# Patient Record
Sex: FEMALE | Race: WHITE | NOT HISPANIC OR LATINO | Employment: OTHER | URBAN - METROPOLITAN AREA
[De-identification: names, ages, dates, MRNs, and addresses within clinical notes are randomized per-mention and may not be internally consistent; named-entity substitution may affect disease eponyms.]

---

## 2017-02-01 ENCOUNTER — HISTORICAL (OUTPATIENT)
Dept: ADMINISTRATIVE | Facility: HOSPITAL | Age: 72
End: 2017-02-01

## 2017-02-06 ENCOUNTER — HISTORICAL (OUTPATIENT)
Dept: RADIOLOGY | Facility: HOSPITAL | Age: 72
End: 2017-02-06

## 2017-06-08 ENCOUNTER — HISTORICAL (OUTPATIENT)
Dept: PREADMISSION TESTING | Facility: HOSPITAL | Age: 72
End: 2017-06-08

## 2017-06-08 ENCOUNTER — HISTORICAL (OUTPATIENT)
Dept: LAB | Facility: HOSPITAL | Age: 72
End: 2017-06-08

## 2017-06-28 ENCOUNTER — HISTORICAL (OUTPATIENT)
Dept: ADMINISTRATIVE | Facility: HOSPITAL | Age: 72
End: 2017-06-28

## 2017-07-07 ENCOUNTER — HISTORICAL (OUTPATIENT)
Dept: SURGERY | Facility: HOSPITAL | Age: 72
End: 2017-07-07

## 2018-03-01 ENCOUNTER — HISTORICAL (OUTPATIENT)
Dept: RADIOLOGY | Facility: HOSPITAL | Age: 73
End: 2018-03-01

## 2018-03-26 ENCOUNTER — HISTORICAL (OUTPATIENT)
Dept: ADMINISTRATIVE | Facility: HOSPITAL | Age: 73
End: 2018-03-26

## 2018-03-27 ENCOUNTER — HISTORICAL (OUTPATIENT)
Dept: RADIOLOGY | Facility: HOSPITAL | Age: 73
End: 2018-03-27

## 2018-06-02 ENCOUNTER — HISTORICAL (OUTPATIENT)
Dept: URGENT CARE | Facility: CLINIC | Age: 73
End: 2018-06-02

## 2018-06-02 ENCOUNTER — HISTORICAL (OUTPATIENT)
Dept: ADMINISTRATIVE | Facility: HOSPITAL | Age: 73
End: 2018-06-02

## 2020-10-21 ENCOUNTER — HISTORICAL (OUTPATIENT)
Dept: ADMINISTRATIVE | Facility: HOSPITAL | Age: 75
End: 2020-10-21

## 2021-03-16 ENCOUNTER — HISTORICAL (OUTPATIENT)
Dept: RADIOLOGY | Facility: HOSPITAL | Age: 76
End: 2021-03-16

## 2021-04-13 ENCOUNTER — HISTORICAL (OUTPATIENT)
Dept: ADMINISTRATIVE | Facility: HOSPITAL | Age: 76
End: 2021-04-13

## 2021-09-01 ENCOUNTER — HISTORICAL (OUTPATIENT)
Dept: ADMINISTRATIVE | Facility: HOSPITAL | Age: 76
End: 2021-09-01

## 2021-10-22 ENCOUNTER — HISTORICAL (OUTPATIENT)
Dept: ADMINISTRATIVE | Facility: HOSPITAL | Age: 76
End: 2021-10-22

## 2021-12-22 ENCOUNTER — HISTORICAL (OUTPATIENT)
Dept: ADMINISTRATIVE | Facility: HOSPITAL | Age: 76
End: 2021-12-22

## 2022-01-21 ENCOUNTER — HISTORICAL (OUTPATIENT)
Dept: ADMINISTRATIVE | Facility: HOSPITAL | Age: 77
End: 2022-01-21

## 2022-01-21 LAB
ALBUMIN SERPL-MCNC: 3.4 GM/DL (ref 3.4–4.8)
ALBUMIN/GLOB SERPL: 1.3 RATIO (ref 1.1–2)
ALP SERPL-CCNC: 76 UNIT/L (ref 40–150)
ALT SERPL-CCNC: 11 UNIT/L (ref 0–55)
AST SERPL-CCNC: 11 UNIT/L (ref 5–34)
BILIRUB SERPL-MCNC: 0.6 MG/DL
BILIRUBIN DIRECT+TOT PNL SERPL-MCNC: 0.2 MG/DL (ref 0–0.5)
BILIRUBIN DIRECT+TOT PNL SERPL-MCNC: 0.4 MG/DL (ref 0–0.8)
BUN SERPL-MCNC: 12 MG/DL (ref 9.8–20.1)
CALCIUM SERPL-MCNC: 7.9 MG/DL (ref 8.7–10.5)
CHLORIDE SERPL-SCNC: 103 MMOL/L (ref 98–107)
CO2 SERPL-SCNC: 27 MMOL/L (ref 23–31)
CREAT SERPL-MCNC: 0.87 MG/DL (ref 0.55–1.02)
DEPRECATED CALCIDIOL+CALCIFEROL SERPL-MC: 31.2 NG/ML (ref 30–80)
EST. AVERAGE GLUCOSE BLD GHB EST-MCNC: 125.5 MG/DL
GLOBULIN SER-MCNC: 2.6 GM/DL (ref 2.4–3.5)
GLUCOSE SERPL-MCNC: 115 MG/DL (ref 82–115)
HBA1C MFR BLD: 6 %
POTASSIUM SERPL-SCNC: 4.3 MMOL/L (ref 3.5–5.1)
PROT SERPL-MCNC: 6 GM/DL (ref 5.8–7.6)
SODIUM SERPL-SCNC: 138 MMOL/L (ref 136–145)

## 2022-03-18 ENCOUNTER — HISTORICAL (OUTPATIENT)
Dept: RADIOLOGY | Facility: HOSPITAL | Age: 77
End: 2022-03-18

## 2022-03-18 ENCOUNTER — HISTORICAL (OUTPATIENT)
Dept: ADMINISTRATIVE | Facility: HOSPITAL | Age: 77
End: 2022-03-18

## 2022-04-07 ENCOUNTER — HISTORICAL (OUTPATIENT)
Dept: ADMINISTRATIVE | Facility: HOSPITAL | Age: 77
End: 2022-04-07
Payer: MEDICARE

## 2022-04-23 VITALS
DIASTOLIC BLOOD PRESSURE: 70 MMHG | BODY MASS INDEX: 45.62 KG/M2 | SYSTOLIC BLOOD PRESSURE: 122 MMHG | WEIGHT: 257.5 LBS | OXYGEN SATURATION: 97 % | HEIGHT: 63 IN

## 2022-04-26 ENCOUNTER — HISTORICAL (OUTPATIENT)
Dept: ADMINISTRATIVE | Facility: HOSPITAL | Age: 77
End: 2022-04-26
Payer: MEDICARE

## 2022-04-26 LAB
ABS NEUT (OLG): 3.73 (ref 2.1–9.2)
ALBUMIN SERPL-MCNC: 3.8 G/DL (ref 3.4–4.8)
ALBUMIN/GLOB SERPL: 1.5 {RATIO} (ref 1.1–2)
ALP SERPL-CCNC: 53 U/L (ref 40–150)
ALT SERPL-CCNC: 27 U/L (ref 0–55)
AST SERPL-CCNC: 19 U/L (ref 5–34)
BASOPHILS # BLD AUTO: 0 10*3/UL (ref 0–0.2)
BASOPHILS NFR BLD AUTO: 1 %
BILIRUB SERPL-MCNC: 0.6 MG/DL
BILIRUBIN DIRECT+TOT PNL SERPL-MCNC: 0.2 (ref 0–0.5)
BILIRUBIN DIRECT+TOT PNL SERPL-MCNC: 0.4 (ref 0–0.8)
BUN SERPL-MCNC: 16.9 MG/DL (ref 9.8–20.1)
CALCIUM SERPL-MCNC: 9 MG/DL (ref 8.7–10.5)
CHLORIDE SERPL-SCNC: 105 MMOL/L (ref 98–107)
CHOLEST SERPL-MCNC: 184 MG/DL
CHOLEST/HDLC SERPL: 4 {RATIO} (ref 0–5)
CO2 SERPL-SCNC: 29 MMOL/L (ref 23–31)
CREAT SERPL-MCNC: 0.96 MG/DL (ref 0.55–1.02)
CREAT UR-MCNC: 195.3 MG/DL (ref 45–106)
DEPRECATED CALCIDIOL+CALCIFEROL SERPL-MC: 47.2 NG/ML (ref 30–80)
EOSINOPHIL # BLD AUTO: 0.1 10*3/UL (ref 0–0.9)
EOSINOPHIL NFR BLD AUTO: 2 %
ERYTHROCYTE [DISTWIDTH] IN BLOOD BY AUTOMATED COUNT: 15 % (ref 11.5–17)
EST. AVERAGE GLUCOSE BLD GHB EST-MCNC: 122.6 MG/DL
GLOBULIN SER-MCNC: 2.6 G/DL (ref 2.4–3.5)
GLUCOSE SERPL-MCNC: 115 MG/DL (ref 82–115)
HBA1C MFR BLD: 5.9 %
HCT VFR BLD AUTO: 41.5 % (ref 37–47)
HDLC SERPL-MCNC: 46 MG/DL (ref 35–60)
HEMOLYSIS INTERF INDEX SERPL-ACNC: <0
HGB BLD-MCNC: 13.1 G/DL (ref 12–16)
ICTERIC INTERF INDEX SERPL-ACNC: 1
LDLC SERPL CALC-MCNC: 110 MG/DL (ref 50–140)
LIPEMIC INTERF INDEX SERPL-ACNC: 0
LYMPHOCYTES # BLD AUTO: 1.8 10*3/UL (ref 0.6–4.6)
LYMPHOCYTES NFR BLD AUTO: 28 %
MANUAL DIFF? (OHS): NO
MCH RBC QN AUTO: 28.9 PG (ref 27–31)
MCHC RBC AUTO-ENTMCNC: 31.6 G/DL (ref 33–36)
MCV RBC AUTO: 91.4 FL (ref 80–94)
MICROALBUMIN UR-MCNC: 6.1
MICROALBUMIN/CREAT RATIO PNL UR: 3.1 (ref 0–30)
MONOCYTES # BLD AUTO: 0.5 10*3/UL (ref 0.1–1.3)
MONOCYTES NFR BLD AUTO: 8 %
NEUTROPHILS # BLD AUTO: 3.73 10*3/UL (ref 2.1–9.2)
NEUTROPHILS NFR BLD AUTO: 60 %
PLATELET # BLD AUTO: 245 10*3/UL (ref 130–400)
PMV BLD AUTO: 10 FL (ref 9.4–12.4)
POTASSIUM SERPL-SCNC: 4.3 MMOL/L (ref 3.5–5.1)
PROT SERPL-MCNC: 6.4 G/DL (ref 5.8–7.6)
RBC # BLD AUTO: 4.54 10*6/UL (ref 4.2–5.4)
SODIUM SERPL-SCNC: 143 MMOL/L (ref 136–145)
TRIGL SERPL-MCNC: 142 MG/DL (ref 37–140)
TSH SERPL-ACNC: 6.68 M[IU]/L (ref 0.35–4.94)
VLDLC SERPL CALC-MCNC: 28 MG/DL
WBC # SPEC AUTO: 6.2 10*3/UL (ref 4.5–11.5)

## 2022-04-28 NOTE — OP NOTE
DATE OF SURGERY:    07/07/2017    SURGEON:  Seferino Salinas MD    ASSISTANT:  Dharmesh Martinez PA-C    PREOPERATIVE DIAGNOSIS:  Right shoulder rotator cuff tear, full thickness.    POSTOPERATIVE DIAGNOSES:    1. Right shoulder rotator cuff tear, full thickness.  2. Partial thickness tearing of long head tendon of biceps, right shoulder.    PROCEDURES:    1. Arthroscopic right shoulder rotator cuff repair.  2. Arthroscopic right shoulder biceps tenotomy.    INSTRUMENT COUNT:  Lap, needle and sponge counts are correct.    COMPLICATIONS:  None.    ESTIMATED BLOOD LOSS:  None.    IMPLANTS:  Arthrex swivel-lock suture anchor x1.    INDICATIONS:  The patient is a 72-year-old female with the above diagnosis.  Risks, benefits and alternatives of operative and nonoperative treatment were explained.  She understood, agreed and wanted to proceed with operative intervention and valid informed consent was obtained.    PROCEDURE IN DETAIL:  She was brought to the operating room and placed on operating table and underwent regional and general anesthesia.  She was positioned and secured in the beach chair position.  The usual sterile ChloraPrep, scrub and paint followed by sterile dressings were performed.  Ioban dressings were placed.  The right shoulder was injected with saline to distend the glenohumeral capsule. A post portal incision was made followed by introduction of the scope into the joint.  The anterior portal was made and a cannula was placed.  The patient had a full thickness supraspinatus tear.  She had greater than 50% tearing of the long head tendon of the biceps at the entrance into the bicipital groove. A biceps tenotomy was performed.  A limited debridement of inflamed synovium was performed.  Articular cartilage was normal in appearance on both the humeral head and the glenoid.  The labrum was intact.  The subacromial space was scoped and small amount of thickened bursa debrided with an arthroscopic shaver.   There was a minimally retracted full thickness supraspinatus tear.  The leading edge was debrided for a couple of millimeters to fresh up the repair surface.  I used the shaver to roughen up the footprint on the greater tuberosity.  The lateral portal was made and a passport cannula was placed.  I placed an inverted mattress fibertape suture through the supraspinatus tendon followed by securing the tendon back to bone with a lateral suture anchor.  There was excellent cuff repair.  The wound was irrigated and suctioned, closed with nylon sutures.  Sterile dressings were applied.  The patient was placed in the sling and abduction pillow and taken to the recovery room in stable condition.        ______________________________  MD REBECCA Vickers/CD  DD:  07/07/2017  Time:  08:04AM  DT:  07/08/2017  Time:  10:40AM  Job #:  [id number]

## 2022-04-28 NOTE — OP NOTE
Patient:   Nora Luna            MRN: [id number]            FIN: [id number]               Age:   72 years     Sex:  Female     :  1945   Associated Diagnoses:   Complete rotator cuff tear or rupture of right shoulder, not specified as traumatic   Author:   Seferino Salinas MD      Brief Operative Note   Operative Information   Preoperative Diagnosis: Complete rotator cuff tear or rupture of right shoulder, not specified as traumatic ([id number] M75.121).     Postoperative Diagnosis: Complete rotator cuff tear or rupture of right shoulder, not specified as traumatic ([id number] M75.121).     Procedures Performed: right shoulder arthroscopic rotator cuff repair and biceps tenotomy.     Indications: pain  weakness.     Surgeon: Seferino Salinas MD.     Assistant: Dharmesh Escobar Removed: none.     Esimated blood loss: No blood loss.     Description of Procedure/Findings/    Complications: see dictation, no complications.

## 2022-07-12 ENCOUNTER — OFFICE VISIT (OUTPATIENT)
Dept: URGENT CARE | Facility: CLINIC | Age: 77
End: 2022-07-12
Payer: MEDICARE

## 2022-07-12 VITALS
WEIGHT: 253.5 LBS | HEART RATE: 75 BPM | TEMPERATURE: 98 F | DIASTOLIC BLOOD PRESSURE: 73 MMHG | HEIGHT: 64 IN | BODY MASS INDEX: 43.28 KG/M2 | SYSTOLIC BLOOD PRESSURE: 136 MMHG | OXYGEN SATURATION: 97 % | RESPIRATION RATE: 18 BRPM

## 2022-07-12 DIAGNOSIS — B02.9 HERPES ZOSTER WITHOUT COMPLICATION: ICD-10-CM

## 2022-07-12 PROCEDURE — 96372 THER/PROPH/DIAG INJ SC/IM: CPT | Mod: ,,, | Performed by: NURSE PRACTITIONER

## 2022-07-12 PROCEDURE — 99213 PR OFFICE/OUTPT VISIT, EST, LEVL III, 20-29 MIN: ICD-10-PCS | Mod: 25,,, | Performed by: NURSE PRACTITIONER

## 2022-07-12 PROCEDURE — 99213 OFFICE O/P EST LOW 20 MIN: CPT | Mod: 25,,, | Performed by: NURSE PRACTITIONER

## 2022-07-12 PROCEDURE — 96372 PR INJECTION,THERAP/PROPH/DIAG2ST, IM OR SUBCUT: ICD-10-PCS | Mod: ,,, | Performed by: NURSE PRACTITIONER

## 2022-07-12 RX ORDER — VALACYCLOVIR HYDROCHLORIDE 1 G/1
1000 TABLET, FILM COATED ORAL 2 TIMES DAILY
Qty: 60 TABLET | Refills: 11 | Status: SHIPPED | OUTPATIENT
Start: 2022-07-12 | End: 2022-07-12

## 2022-07-12 RX ORDER — CETIRIZINE HYDROCHLORIDE 10 MG/1
10 TABLET ORAL
COMMUNITY
Start: 2021-12-20

## 2022-07-12 RX ORDER — LEVOTHYROXINE SODIUM 150 UG/1
150 TABLET ORAL DAILY
COMMUNITY
Start: 2022-02-14

## 2022-07-12 RX ORDER — PREGABALIN 75 MG/1
75 CAPSULE ORAL 3 TIMES DAILY
COMMUNITY
Start: 2022-07-11

## 2022-07-12 RX ORDER — VALACYCLOVIR HYDROCHLORIDE 1 G/1
1000 TABLET, FILM COATED ORAL 2 TIMES DAILY
Qty: 14 TABLET | Refills: 0 | Status: SHIPPED | OUTPATIENT
Start: 2022-07-12 | End: 2022-07-19

## 2022-07-12 RX ORDER — MONTELUKAST SODIUM 10 MG/1
10 TABLET ORAL
COMMUNITY
Start: 2021-11-29

## 2022-07-12 RX ORDER — ALENDRONATE SODIUM 70 MG/1
70 TABLET ORAL
COMMUNITY
Start: 2022-01-28

## 2022-07-12 RX ORDER — FLUTICASONE PROPIONATE 50 MCG
SPRAY, SUSPENSION (ML) NASAL
COMMUNITY
Start: 2022-02-17

## 2022-07-12 RX ORDER — BETAMETHASONE SODIUM PHOSPHATE AND BETAMETHASONE ACETATE 3; 3 MG/ML; MG/ML
6 INJECTION, SUSPENSION INTRA-ARTICULAR; INTRALESIONAL; INTRAMUSCULAR; SOFT TISSUE
Status: COMPLETED | OUTPATIENT
Start: 2022-07-12 | End: 2022-07-12

## 2022-07-12 RX ORDER — ATORVASTATIN CALCIUM 20 MG/1
TABLET, FILM COATED ORAL
COMMUNITY
Start: 2022-06-28

## 2022-07-12 RX ORDER — GABAPENTIN 300 MG/1
300 CAPSULE ORAL
COMMUNITY
Start: 2022-01-28

## 2022-07-12 RX ORDER — FUROSEMIDE 20 MG/1
20 TABLET ORAL DAILY
COMMUNITY
Start: 2022-05-02

## 2022-07-12 RX ADMIN — BETAMETHASONE SODIUM PHOSPHATE AND BETAMETHASONE ACETATE 6 MG: 3; 3 INJECTION, SUSPENSION INTRA-ARTICULAR; INTRALESIONAL; INTRAMUSCULAR; SOFT TISSUE at 03:07

## 2022-07-12 NOTE — PROGRESS NOTES
"Subjective:       Patient ID: Nora Luna is a 77 y.o. female.    Vitals:  height is 5' 4" (1.626 m) and weight is 115 kg (253 lb 8.5 oz). Her temperature is 97.9 °F (36.6 °C). Her blood pressure is 136/73 and her pulse is 75. Her respiration is 18 and oxygen saturation is 97%.     Chief Complaint: Rash    A 77-year-old female presents with painful rash to the back of head. C/o burning pain 7/10 . History of shingles in the past 3 years ago.     Rash  This is a recurrent problem. The current episode started more than 1 year ago. The affected locations include the scalp and head. The rash is characterized by pain.       Skin: Positive for rash.       Objective:      Physical Exam   Cardiovascular: Normal rate, regular rhythm, normal heart sounds and normal pulses.   Pulmonary/Chest: Effort normal and breath sounds normal.   Abdominal: Normal appearance.   Neurological: She is alert.   Skin: Skin is rash, pustular and vesicular.              Assessment:       1. Herpes zoster without complication          Plan:     prescription medication as directed, Valtrex  Continue taking home tramadol for pain as directed  Follow-up with your primary care provider or return here for concerns    Herpes zoster without complication  -     betamethasone acetate-betamethasone sodium phosphate injection 6 mg  -     Discontinue: valACYclovir (VALTREX) 1000 MG tablet; Take 1 tablet (1,000 mg total) by mouth 2 (two) times daily.  Dispense: 60 tablet; Refill: 11  -     valACYclovir (VALTREX) 1000 MG tablet; Take 1 tablet (1,000 mg total) by mouth 2 (two) times daily. for 7 days  Dispense: 14 tablet; Refill: 0                   "

## 2022-07-12 NOTE — PATIENT INSTRUCTIONS
prescription medication as directed, Valtrex  Continue taking home tramadol for pain as directed  Follow-up with your primary care provider or return here for concerns

## 2022-10-24 ENCOUNTER — LAB VISIT (OUTPATIENT)
Dept: LAB | Facility: HOSPITAL | Age: 77
End: 2022-10-24
Payer: MEDICARE

## 2022-10-24 DIAGNOSIS — E03.8 TOXIC DIFFUSE GOITER WITH PRETIBIAL MYXEDEMA: Primary | ICD-10-CM

## 2022-10-24 DIAGNOSIS — R79.89 HYPOURICEMIA: ICD-10-CM

## 2022-10-24 DIAGNOSIS — E05.00 TOXIC DIFFUSE GOITER WITH PRETIBIAL MYXEDEMA: Primary | ICD-10-CM

## 2022-10-24 LAB — TSH SERPL-ACNC: 11.08 UIU/ML (ref 0.35–4.94)

## 2022-10-24 PROCEDURE — 84443 ASSAY THYROID STIM HORMONE: CPT

## 2022-10-24 PROCEDURE — 36415 COLL VENOUS BLD VENIPUNCTURE: CPT

## 2022-12-23 ENCOUNTER — LAB VISIT (OUTPATIENT)
Dept: LAB | Facility: HOSPITAL | Age: 77
End: 2022-12-23
Attending: INTERNAL MEDICINE
Payer: MEDICARE

## 2022-12-23 DIAGNOSIS — E03.8 TOXIC DIFFUSE GOITER WITH PRETIBIAL MYXEDEMA: Primary | ICD-10-CM

## 2022-12-23 DIAGNOSIS — R73.03 DIABETES MELLITUS, LATENT: ICD-10-CM

## 2022-12-23 DIAGNOSIS — E78.5 HYPERLIPIDEMIA, UNSPECIFIED HYPERLIPIDEMIA TYPE: ICD-10-CM

## 2022-12-23 DIAGNOSIS — E05.00 TOXIC DIFFUSE GOITER WITH PRETIBIAL MYXEDEMA: Primary | ICD-10-CM

## 2022-12-23 DIAGNOSIS — I10 ESSENTIAL HYPERTENSION, MALIGNANT: ICD-10-CM

## 2022-12-23 DIAGNOSIS — E78.1 PURE HYPERGLYCERIDEMIA: ICD-10-CM

## 2022-12-23 DIAGNOSIS — Z11.59 SCREENING EXAMINATION FOR POLIOMYELITIS: ICD-10-CM

## 2022-12-23 DIAGNOSIS — R79.89 LOW VITAMIN D LEVEL: ICD-10-CM

## 2022-12-23 LAB
ALBUMIN SERPL-MCNC: 3.3 G/DL (ref 3.4–4.8)
ALBUMIN/GLOB SERPL: 1.2 RATIO (ref 1.1–2)
ALP SERPL-CCNC: 61 UNIT/L (ref 40–150)
ALT SERPL-CCNC: 22 UNIT/L (ref 0–55)
AST SERPL-CCNC: 17 UNIT/L (ref 5–34)
BILIRUBIN DIRECT+TOT PNL SERPL-MCNC: 0.2 MG/DL (ref 0–0.5)
BILIRUBIN DIRECT+TOT PNL SERPL-MCNC: 0.8 MG/DL
BUN SERPL-MCNC: 14.3 MG/DL (ref 9.8–20.1)
CALCIUM SERPL-MCNC: 8.4 MG/DL (ref 8.4–10.2)
CHLORIDE SERPL-SCNC: 108 MMOL/L (ref 98–107)
CHOLEST SERPL-MCNC: 208 MG/DL
CHOLEST/HDLC SERPL: 4 {RATIO} (ref 0–5)
CO2 SERPL-SCNC: 27 MMOL/L (ref 23–31)
CREAT SERPL-MCNC: 0.81 MG/DL (ref 0.55–1.02)
DEPRECATED CALCIDIOL+CALCIFEROL SERPL-MC: 40.2 NG/ML (ref 30–80)
ERYTHROCYTE [DISTWIDTH] IN BLOOD BY AUTOMATED COUNT: 14.3 % (ref 11–14.5)
EST. AVERAGE GLUCOSE BLD GHB EST-MCNC: 122.6 MG/DL
GFR SERPLBLD CREATININE-BSD FMLA CKD-EPI: >60 MLS/MIN/1.73/M2
GLOBULIN SER-MCNC: 2.7 GM/DL (ref 2.4–3.5)
GLUCOSE SERPL-MCNC: 101 MG/DL (ref 82–115)
HBA1C MFR BLD: 5.9 %
HBV CORE AB SERPL QL IA: NONREACTIVE
HBV SURFACE AB SER-ACNC: 1.01 MIU/ML
HBV SURFACE AB SERPL IA-ACNC: NONREACTIVE M[IU]/ML
HBV SURFACE AG SERPL QL IA: NONREACTIVE
HCT VFR BLD AUTO: 39.6 % (ref 37–47)
HCV AB SERPL QL IA: NONREACTIVE
HDLC SERPL-MCNC: 53 MG/DL (ref 35–60)
HGB BLD-MCNC: 12.7 GM/DL (ref 12–16)
LDLC SERPL CALC-MCNC: 126 MG/DL (ref 50–140)
MCH RBC QN AUTO: 30 PG
MCHC RBC AUTO-ENTMCNC: 32.1 MG/DL (ref 33–36)
MCV RBC AUTO: 93.4 FL (ref 80–94)
NRBC BLD AUTO-RTO: 0 % (ref 0–1)
PLATELET # BLD AUTO: 271 X10(3)/MCL (ref 140–371)
PMV BLD AUTO: 9.4 FL (ref 9.4–12.4)
POTASSIUM SERPL-SCNC: 4.5 MMOL/L (ref 3.5–5.1)
PROT SERPL-MCNC: 6 GM/DL (ref 5.8–7.6)
RBC # BLD AUTO: 4.24 X10(6)/MCL (ref 4.2–5.4)
SODIUM SERPL-SCNC: 142 MMOL/L (ref 136–145)
T4 FREE SERPL-MCNC: 1.43 NG/DL (ref 0.7–1.48)
TRIGL SERPL-MCNC: 143 MG/DL (ref 37–140)
TSH SERPL-ACNC: 0.04 UIU/ML (ref 0.35–4.94)
VLDLC SERPL CALC-MCNC: 29 MG/DL
WBC # SPEC AUTO: 8.5 X10(3)/MCL (ref 4.5–11.5)

## 2022-12-23 PROCEDURE — 86704 HEP B CORE ANTIBODY TOTAL: CPT

## 2022-12-23 PROCEDURE — 82248 BILIRUBIN DIRECT: CPT

## 2022-12-23 PROCEDURE — 82306 VITAMIN D 25 HYDROXY: CPT | Mod: GA

## 2022-12-23 PROCEDURE — 36415 COLL VENOUS BLD VENIPUNCTURE: CPT

## 2022-12-23 PROCEDURE — 84439 ASSAY OF FREE THYROXINE: CPT

## 2022-12-23 PROCEDURE — 80053 COMPREHEN METABOLIC PANEL: CPT

## 2022-12-23 PROCEDURE — 87340 HEPATITIS B SURFACE AG IA: CPT

## 2022-12-23 PROCEDURE — 83036 HEMOGLOBIN GLYCOSYLATED A1C: CPT

## 2022-12-23 PROCEDURE — 80061 LIPID PANEL: CPT

## 2022-12-23 PROCEDURE — 85027 COMPLETE CBC AUTOMATED: CPT | Mod: GA

## 2022-12-23 PROCEDURE — 86706 HEP B SURFACE ANTIBODY: CPT

## 2022-12-23 PROCEDURE — 86803 HEPATITIS C AB TEST: CPT

## 2022-12-23 PROCEDURE — 84443 ASSAY THYROID STIM HORMONE: CPT

## 2022-12-27 ENCOUNTER — LAB VISIT (OUTPATIENT)
Dept: LAB | Facility: HOSPITAL | Age: 77
End: 2022-12-27
Attending: DERMATOLOGY
Payer: MEDICARE

## 2022-12-27 DIAGNOSIS — Z11.59 SCREENING EXAMINATION FOR POLIOMYELITIS: Primary | ICD-10-CM

## 2022-12-27 PROCEDURE — 86480 TB TEST CELL IMMUN MEASURE: CPT

## 2022-12-27 PROCEDURE — 36415 COLL VENOUS BLD VENIPUNCTURE: CPT

## 2022-12-29 LAB
GAMMA INTERFERON BACKGROUND BLD IA-ACNC: 0.02 IU/ML
M TB IFN-G BLD-IMP: NEGATIVE
M TB IFN-G CD4+ BCKGRND COR BLD-ACNC: 0.01 IU/ML
M TB IFN-G CD4+CD8+ BCKGRND COR BLD-ACNC: 0.02 IU/ML
MITOGEN IGNF BCKGRD COR BLD-ACNC: 9.98 IU/ML

## 2023-03-20 ENCOUNTER — HOSPITAL ENCOUNTER (OUTPATIENT)
Dept: RADIOLOGY | Facility: HOSPITAL | Age: 78
Discharge: HOME | End: 2023-03-20
Attending: INTERNAL MEDICINE
Payer: MEDICARE

## 2023-03-20 DIAGNOSIS — Z12.31 BREAST CANCER SCREENING BY MAMMOGRAM: ICD-10-CM

## 2023-03-20 DIAGNOSIS — M85.851 OTHER SPECIFIED DISORDERS OF BONE DENSITY AND STRUCTURE, RIGHT THIGH: ICD-10-CM

## 2023-03-20 PROCEDURE — 77063 MAMMO DIGITAL SCREENING BILAT WITH TOMO: ICD-10-PCS | Mod: 26,,, | Performed by: RADIOLOGY

## 2023-03-20 PROCEDURE — 77080 DXA BONE DENSITY AXIAL: CPT | Mod: XU,TC

## 2023-03-20 PROCEDURE — 77080 DXA BONE DENSITY AXIAL SKELETON 1 OR MORE SITES: ICD-10-PCS | Mod: 26,XU,, | Performed by: STUDENT IN AN ORGANIZED HEALTH CARE EDUCATION/TRAINING PROGRAM

## 2023-03-20 PROCEDURE — 77067 SCR MAMMO BI INCL CAD: CPT | Mod: 26,,, | Performed by: RADIOLOGY

## 2023-03-20 PROCEDURE — 77080 DXA BONE DENSITY AXIAL: CPT | Mod: 26,XU,, | Performed by: STUDENT IN AN ORGANIZED HEALTH CARE EDUCATION/TRAINING PROGRAM

## 2023-03-20 PROCEDURE — 77067 MAMMO DIGITAL SCREENING BILAT WITH TOMO: ICD-10-PCS | Mod: 26,,, | Performed by: RADIOLOGY

## 2023-03-20 PROCEDURE — 77063 BREAST TOMOSYNTHESIS BI: CPT | Mod: 26,,, | Performed by: RADIOLOGY

## 2023-03-20 PROCEDURE — 77067 SCR MAMMO BI INCL CAD: CPT | Mod: TC

## 2023-03-20 PROCEDURE — 77081 DXA BONE DENSITY APPENDICULR: CPT | Mod: TC

## 2023-03-20 PROCEDURE — 77081 DXA BONE DENSITY APPENDICULR: CPT | Mod: 26,,, | Performed by: STUDENT IN AN ORGANIZED HEALTH CARE EDUCATION/TRAINING PROGRAM

## 2023-03-20 PROCEDURE — 77081 DEXA BONE DENSITY APPENDICULAR SKELETON: ICD-10-PCS | Mod: 26,,, | Performed by: STUDENT IN AN ORGANIZED HEALTH CARE EDUCATION/TRAINING PROGRAM

## 2024-01-31 ENCOUNTER — OFFICE VISIT (OUTPATIENT)
Dept: ORTHOPEDICS | Facility: CLINIC | Age: 79
End: 2024-01-31
Payer: MEDICARE

## 2024-01-31 ENCOUNTER — HOSPITAL ENCOUNTER (OUTPATIENT)
Dept: RADIOLOGY | Facility: CLINIC | Age: 79
Discharge: HOME | End: 2024-01-31
Attending: STUDENT IN AN ORGANIZED HEALTH CARE EDUCATION/TRAINING PROGRAM
Payer: MEDICARE

## 2024-01-31 VITALS
DIASTOLIC BLOOD PRESSURE: 62 MMHG | HEIGHT: 64 IN | BODY MASS INDEX: 41.48 KG/M2 | TEMPERATURE: 98 F | HEART RATE: 96 BPM | SYSTOLIC BLOOD PRESSURE: 121 MMHG | WEIGHT: 243 LBS

## 2024-01-31 DIAGNOSIS — M18.0 OSTEOARTHRITIS OF CARPOMETACARPAL (CMC) JOINT OF BOTH THUMBS: Primary | ICD-10-CM

## 2024-01-31 DIAGNOSIS — M79.641 BILATERAL HAND PAIN: ICD-10-CM

## 2024-01-31 DIAGNOSIS — G56.02 LEFT CARPAL TUNNEL SYNDROME: ICD-10-CM

## 2024-01-31 DIAGNOSIS — M79.642 BILATERAL HAND PAIN: ICD-10-CM

## 2024-01-31 PROCEDURE — 20526 THER INJECTION CARP TUNNEL: CPT | Mod: 59,LT,, | Performed by: STUDENT IN AN ORGANIZED HEALTH CARE EDUCATION/TRAINING PROGRAM

## 2024-01-31 PROCEDURE — 99203 OFFICE O/P NEW LOW 30 MIN: CPT | Mod: 25,,, | Performed by: STUDENT IN AN ORGANIZED HEALTH CARE EDUCATION/TRAINING PROGRAM

## 2024-01-31 PROCEDURE — 73130 X-RAY EXAM OF HAND: CPT | Mod: LT,,, | Performed by: STUDENT IN AN ORGANIZED HEALTH CARE EDUCATION/TRAINING PROGRAM

## 2024-01-31 PROCEDURE — 20600 DRAIN/INJ JOINT/BURSA W/O US: CPT | Mod: 50,,, | Performed by: STUDENT IN AN ORGANIZED HEALTH CARE EDUCATION/TRAINING PROGRAM

## 2024-01-31 PROCEDURE — 73130 X-RAY EXAM OF HAND: CPT | Mod: RT,,, | Performed by: STUDENT IN AN ORGANIZED HEALTH CARE EDUCATION/TRAINING PROGRAM

## 2024-01-31 RX ORDER — LIDOCAINE HYDROCHLORIDE 10 MG/ML
1 INJECTION INFILTRATION; PERINEURAL
Status: DISCONTINUED | OUTPATIENT
Start: 2024-01-31 | End: 2024-01-31 | Stop reason: HOSPADM

## 2024-01-31 RX ORDER — TROSPIUM CHLORIDE 20 MG/1
20 TABLET, FILM COATED ORAL 2 TIMES DAILY
COMMUNITY
Start: 2023-12-04

## 2024-01-31 RX ORDER — SEMAGLUTIDE 0.68 MG/ML
INJECTION, SOLUTION SUBCUTANEOUS
COMMUNITY

## 2024-01-31 RX ORDER — BETAMETHASONE SODIUM PHOSPHATE AND BETAMETHASONE ACETATE 3; 3 MG/ML; MG/ML
6 INJECTION, SUSPENSION INTRA-ARTICULAR; INTRALESIONAL; INTRAMUSCULAR; SOFT TISSUE
Status: DISCONTINUED | OUTPATIENT
Start: 2024-01-31 | End: 2024-01-31 | Stop reason: HOSPADM

## 2024-01-31 RX ADMIN — BETAMETHASONE SODIUM PHOSPHATE AND BETAMETHASONE ACETATE 6 MG: 3; 3 INJECTION, SUSPENSION INTRA-ARTICULAR; INTRALESIONAL; INTRAMUSCULAR; SOFT TISSUE at 08:01

## 2024-01-31 RX ADMIN — LIDOCAINE HYDROCHLORIDE 1 ML: 10 INJECTION INFILTRATION; PERINEURAL at 08:01

## 2024-01-31 NOTE — PROGRESS NOTES
[facility] - [facility]    Chief Complaint: Bilateral hand pain      History of Present Illness: Nora Luna is a 78 y.o. female here today for bilateral hand pain. Patient had carpal tunnel release about 20 years ago. She states that she began having pain about 2 weeks ago. She says that the pain wakes her up at night and she is experiencing some numbness. She wears CMC braces that help with the pain.      Past Medical History:   Diagnosis Date    Anxiety     Hyperlipidemia     Hypertension     Hypothyroidism     Neuropathy         Past Surgical History:   Procedure Laterality Date    ABLATION      ARTHROSCOPY OF BOTH KNEES      CATARACT EXTRACTION      CHOLECYSTECTOMY      COLECTOMY      CYST REMOVAL      EYE SURGERY      HAND SURGERY      hernia repair      HYSTERECTOMY      LIPOMA RESECTION      MOLE REMOVAL      SHOULDER ARTHROSCOPY W/ ROTATOR CUFF REPAIR      THYROIDECTOMY, PARTIAL      TOTAL KNEE ARTHROPLASTY          Social History     Tobacco Use    Smoking status: Never    Smokeless tobacco: Never   Substance and Sexual Activity    Alcohol use: Never    Drug use: Not on file    Sexual activity: Not on file        Current Outpatient Medications   Medication Instructions    alendronate (FOSAMAX) 70 mg, Oral    aspirin 81 mg, Oral    atorvastatin (LIPITOR) 20 MG tablet SMARTSI Tablet(s) By Mouth Every Evening    cetirizine (ZYRTEC) 10 mg, Oral    fluticasone propionate (FLONASE) 50 mcg/actuation nasal spray SHAKE LIQUID AND USE 1 SPRAY IN EACH NOSTRIL DAILY AS NEEDED FOR ALLERGY SYMPTOMS    furosemide (LASIX) 20 mg, Oral, Daily    gabapentin (NEURONTIN) 300 mg, Oral    levothyroxine (SYNTHROID) 150 mcg, Oral, Daily    montelukast (SINGULAIR) 10 mg, Oral    OZEMPIC 0.25 mg or 0.5 mg (2 mg/3 mL) pen injector INJECT 0.5 MG SUBCUTANEOUS ONCE WEEKLY    pregabalin (LYRICA) 75 mg, Oral, 3 times daily    trospium (SANCTURA) 20 mg, Oral, 2 times daily    valACYclovir (VALTREX) 1,000 mg, Oral, 2 times  "daily       Review of patient's allergies indicates:   Allergen Reactions    Sulfamethoxazole-trimethoprim Itching        Patient Care Team:  Luz Boogie MD as PCP - General  Lelo Tavarez DO     Review of Systems:    Constitution:   Denies chills, fever, and sweats.  HENT:   Denies headaches or blurry vision.  Cardiovascular:   Denies chest pain or irregular heart beat.  Respiratory:   Denies cough or shortness of breath.  Gastrointestinal:  Denies abdominal pain, nausea, or vomiting.  Musculoskeletal:   Denies muscle cramps.  Neurological:   Denies dizziness or focal weakness.  Psychiatric/Behavior: Normal mental status.  Hematology/Lymph:  Denies bleeding problem or easy bruising/bleeding.  Skin:    Denies rash or suspicious lesions.    Physical Examination:    Vital Signs:    Vitals:    01/31/24 0840   BP: 121/62   Pulse: 96   Temp: 97.8 °F (36.6 °C)   Weight: 110.2 kg (243 lb)   Height: 5' 4" (1.626 m)   Body mass index is 41.71 kg/m².    Constitution:   Well-developed, well nourished patient in no acute distress.  Neurological:   Alert and oriented x 3 and cooperative to examination.     Psychiatric/Behavior: Normal mental status.  Respiratory:   No shortness of breath. Non-labored breathing.  Eyes:    Extraoccular muscles intact.    Musculoskeletal Examination:   Right Upper Extremity:    [x] No open wounds or rashes.    [] Abnormal skin findings:  [x] Positive Tinel's and Phalen's.     [] Negative Tinel's and Phalen's.  [x] No hypothenar or thenar atrophy.     [] Hypothenar and thenar atrophy.  [x] Full ROM of the wrist, hand and digits.    [] Limited ROM of the wrist, hand, and digits:   [x] Sensation is intact in the median nerve distribution.    [x] APB strength is 5/5 compared to the contralateral side.       [x] Radial pulses 2+ is warm well perfused.      Tenderness to palpation of thumb carpometacarpal joint    Left Upper Extremity:  [x] No open wounds or rashes.    [] Abnormal skin " findings:  [x] Positive Tinel's and Phalen's.     [] Negative Tinel's and Phalen's.  [x] No hypothenar or thenar atrophy.     [] Hypothenar and thenar atrophy.  [x] Full ROM of the wrist, hand and digits.    [] Limited ROM of the wrist, hand, and digits:   [x] Sensation is intact in the median nerve distribution.    [x] APB strength is 5/5 compared to the contralateral side.       [x] Radial pulses 2+ is warm well perfused.      Tenderness to palpation of thumb carpometacarpal joint         Imaging:   X-rays of bilateral hands three views shows osteoarthritis of thumb carpometacarpal joint     Assessment:  Bilateral CMC osteoarthritis, Bilateral recalcitrant carpal tunnel syndrome    Plan:  Conservatively.  I will give her injections into bilateral thumb CMC joints as well as the left carpal tunnel.  For both of these.  I can see her back as needed if her symptoms worsen    Follow Up: PRN    X-Ray at Next Visit: None    Josef Millan MD personally performed the services described in this documentation, including but not limited to patient's history, physical examination, and assessment and plan of care. All medical record entries made by Jerri Giron PA-C were performed at his direction and in his presence. The medical record was reviewed and is accurate and complete.

## 2024-01-31 NOTE — PROCEDURES
Small Joint Aspiration/Injection: L thumb CMC    Date/Time: 1/31/2024 8:15 AM    Performed by: Josef Millan MD  Authorized by: Josef Millan MD    Consent Done?:  Yes (Verbal)  Indications:  Pain  Timeout: prior to procedure the correct patient, procedure, and site was verified    Location:  Thumb  Site:  L thumb CMC  Needle size:  25 G  Approach:  Dorsal  Medications:  1 mL LIDOcaine HCL 10 mg/ml (1%) 10 mg/mL (1 %); 6 mg betamethasone acetate-betamethasone sodium phosphate 6 mg/mL  Patient tolerance:  Patient tolerated the procedure well with no immediate complications  Carpal Tunnel    Date/Time: 1/31/2024 8:15 AM    Performed by: Josef Millan MD  Authorized by: Josef Millan MD    Consent Done?:  Yes (Verbal)  Indications:  Pain and diagnostic evaluation  Timeout: prior to procedure the correct patient, procedure, and site was verified    Location:  Wrist  Ultrasonic Guidance for Needle Placement?: No    Needle size:  25 G  Approach:  Volar  Medications:  1 mL LIDOcaine HCL 10 mg/ml (1%) 10 mg/mL (1 %); 6 mg betamethasone acetate-betamethasone sodium phosphate 6 mg/mL  Patient tolerance:  Patient tolerated the procedure well with no immediate complications  Small Joint Aspiration/Injection: R thumb CMC    Date/Time: 1/31/2024 8:15 AM    Performed by: Josef Millan MD  Authorized by: Josef Millan MD    Consent Done?:  Yes (Verbal)  Indications:  Pain  Timeout: prior to procedure the correct patient, procedure, and site was verified    Location:  Thumb  Site:  R thumb CMC  Needle size:  25 G  Approach:  Dorsal  Medications:  1 mL LIDOcaine HCL 10 mg/ml (1%) 10 mg/mL (1 %); 6 mg betamethasone acetate-betamethasone sodium phosphate 6 mg/mL  Patient tolerance:  Patient tolerated the procedure well with no immediate complications

## 2024-04-23 ENCOUNTER — HOSPITAL ENCOUNTER (OUTPATIENT)
Dept: RADIOLOGY | Facility: HOSPITAL | Age: 79
Discharge: HOME | End: 2024-04-23
Attending: FAMILY MEDICINE
Payer: MEDICARE

## 2024-04-23 DIAGNOSIS — Z12.31 ENCOUNTER FOR SCREENING MAMMOGRAM FOR BREAST CANCER: ICD-10-CM

## 2024-04-23 PROCEDURE — 77063 BREAST TOMOSYNTHESIS BI: CPT | Mod: TC

## 2024-04-23 PROCEDURE — 77063 BREAST TOMOSYNTHESIS BI: CPT | Mod: 26,,, | Performed by: STUDENT IN AN ORGANIZED HEALTH CARE EDUCATION/TRAINING PROGRAM

## 2024-04-23 PROCEDURE — 77067 SCR MAMMO BI INCL CAD: CPT | Mod: 26,,, | Performed by: STUDENT IN AN ORGANIZED HEALTH CARE EDUCATION/TRAINING PROGRAM

## 2024-04-24 ENCOUNTER — OFFICE VISIT (OUTPATIENT)
Dept: ORTHOPEDICS | Facility: CLINIC | Age: 79
End: 2024-04-24
Payer: MEDICARE

## 2024-04-24 ENCOUNTER — HOSPITAL ENCOUNTER (OUTPATIENT)
Dept: RADIOLOGY | Facility: CLINIC | Age: 79
Discharge: HOME | End: 2024-04-24
Attending: PHYSICIAN ASSISTANT
Payer: MEDICARE

## 2024-04-24 VITALS
SYSTOLIC BLOOD PRESSURE: 108 MMHG | WEIGHT: 242.94 LBS | DIASTOLIC BLOOD PRESSURE: 61 MMHG | HEART RATE: 69 BPM | BODY MASS INDEX: 41.48 KG/M2 | HEIGHT: 64 IN

## 2024-04-24 DIAGNOSIS — M17.12 PRIMARY OSTEOARTHRITIS OF LEFT KNEE: ICD-10-CM

## 2024-04-24 DIAGNOSIS — M25.562 LEFT KNEE PAIN, UNSPECIFIED CHRONICITY: Primary | ICD-10-CM

## 2024-04-24 DIAGNOSIS — M25.562 LEFT KNEE PAIN, UNSPECIFIED CHRONICITY: ICD-10-CM

## 2024-04-24 PROCEDURE — 99203 OFFICE O/P NEW LOW 30 MIN: CPT | Mod: ,,, | Performed by: PHYSICIAN ASSISTANT

## 2024-04-24 PROCEDURE — 73564 X-RAY EXAM KNEE 4 OR MORE: CPT | Mod: LT,,, | Performed by: PHYSICIAN ASSISTANT

## 2024-04-24 RX ORDER — LISINOPRIL 20 MG/1
20 TABLET ORAL DAILY
COMMUNITY

## 2024-04-24 RX ORDER — TRIAMCINOLONE ACETONIDE 1 MG/G
1 CREAM TOPICAL 3 TIMES DAILY PRN
COMMUNITY

## 2024-04-24 RX ORDER — MULTIVITAMIN
1 TABLET ORAL DAILY
COMMUNITY

## 2024-04-24 RX ORDER — TRAMADOL HYDROCHLORIDE 50 MG/1
50 TABLET ORAL
COMMUNITY
Start: 2023-11-06

## 2024-04-24 RX ORDER — DICLOFENAC SODIUM 10 MG/G
2 GEL TOPICAL DAILY
COMMUNITY

## 2024-04-24 NOTE — PROGRESS NOTES
Chief Complaint:   Chief Complaint   Patient presents with    Knee Pain     L knee pain . Reports baker cyst at the back of the knee. Has been ongoing for a while. States the cyst has been on/off. Hx of ablations. Limited ROM.        History of present illness:    This is a 79 y.o. year old female who complains of left knee pain.    Patient has a longstanding history of left knee osteoarthritis and she states he was does not have a Baker's cyst in the back of the knee that it was gave him some discomfort at times.    She also has some peripheral vascular disease and lower extremity edema for which she was had multiple ablations in her venous system for this.    She was not has a good surgical candidate due to her venous disease along with some other health problems and a BMI of over 40 presently      Current Outpatient Medications   Medication Sig Dispense Refill    alendronate (FOSAMAX) 70 MG tablet Take 70 mg by mouth.      ALPRAZOLAM ORAL Take 5 mg by mouth.      aspirin 81 mg Cap Take 81 mg by mouth.      atorvastatin (LIPITOR) 20 MG tablet SMARTSI Tablet(s) By Mouth Every Evening      diclofenac sodium (VOLTAREN) 1 % Gel Apply 2 g topically once daily.      furosemide (LASIX) 20 MG tablet Take 20 mg by mouth once daily.      levothyroxine (SYNTHROID) 150 MCG tablet Take 150 mcg by mouth once daily.      lisinopriL (PRINIVIL,ZESTRIL) 20 MG tablet Take 20 mg by mouth once daily.      multivitamin (ONE DAILY MULTIVITAMIN) per tablet Take 1 tablet by mouth once daily.      OZEMPIC 0.25 mg or 0.5 mg (2 mg/3 mL) pen injector INJECT 0.5 MG SUBCUTANEOUS ONCE WEEKLY      traMADoL (ULTRAM) 50 mg tablet Take 50 mg by mouth.      triamcinolone acetonide 0.1% (KENALOG) 0.1 % cream Apply 1 Application topically 3 (three) times daily as needed.      cetirizine (ZYRTEC) 10 MG tablet Take 10 mg by mouth.      fluticasone propionate (FLONASE) 50 mcg/actuation nasal spray SHAKE LIQUID AND USE 1 SPRAY IN EACH NOSTRIL DAILY AS  "NEEDED FOR ALLERGY SYMPTOMS      gabapentin (NEURONTIN) 300 MG capsule Take 300 mg by mouth.      montelukast (SINGULAIR) 10 mg tablet Take 10 mg by mouth. (Patient not taking: Reported on 4/24/2024)      pregabalin (LYRICA) 75 MG capsule Take 75 mg by mouth 3 (three) times daily. (Patient not taking: Reported on 4/24/2024)      trospium (SANCTURA) 20 mg Tab tablet Take 20 mg by mouth 2 (two) times daily.      valACYclovir (VALTREX) 1000 MG tablet Take 1 tablet (1,000 mg total) by mouth 2 (two) times daily. for 7 days 14 tablet 0     No current facility-administered medications for this visit.       Review of Systems:    Constitution:   Denies chills, fever, and sweats.  HENT:   Denies headaches or blurry vision.  Cardiovascular:  Denies chest pain or irregular heart beat.  Respiratory:   Denies cough or shortness of breath.  Gastrointestinal:  Denies abdominal pain, nausea, or vomiting.  Musculoskeletal:   Denies muscle cramps.  Neurological:   Denies dizziness or focal weakness.  Psychiatric/Behavior: Normal mental status.  Hematology/Lymph:  Denies bleeding problem or easy bruising/bleeding.  Skin:    Denies rash or suspicious lesions.    Examination:    Vital Signs:    Vitals:    04/24/24 0853   BP: 108/61   Pulse: 69   Weight: 110.2 kg (242 lb 15.2 oz)   Height: 5' 4" (1.626 m)       Body mass index is 41.7 kg/m².    Constitution:   Well-developed, well nourished patient in no acute distress.  Neurological:   Alert and oriented x 3 and cooperative to examination.     Psychiatric/Behavior: Normal mental status.  Respiratory:   No shortness of breath.  Eyes:    Extraoccular muscles intact  Skin:    No scars, rash or suspicious lesions.    Physical Exam:       General Musculoskeletal Exam   Gait: antalgic       Left Knee Exam     Inspection   Erythema: absent  Effusion: present  Deformity: present  Bruising: absent    Tenderness   The patient is tender to palpation of the medial and patellofemoral joint " line    Crepitus   The patient has crepitus with ROM    Range of Motion   Extension: abnormal   Flexion: abnormal   5-120    Tests   Meniscus   Corky:  Negative  Ligament Examination   MCL - Valgus: normal (0 to 2mm)  LCL - Varus: normal  Patella   Passive Patellar Tilt: neutral    Other   Sensation: normal    Comments:  Varus deformity    Muscle Strength   Right Lower Extremity   Quadriceps:  5/5   Hamstrin/5     Vascular Exam     Two to 3+ edema of bilateral lower extremities.    Small popliteal cyst palpated      Imaging: X-rays ordered and images interpreted today personally by me of left knee four views   Patient does have bone-on-bone contact medial compartment   Varus deformity left leg pain   Significant narrowing and degenerative changes of the patellofemoral joint.            Assessment: Left knee pain, unspecified chronicity  -     X-Ray Knee Complete 4 or More Views Left; Future; Expected date: 2024    Primary osteoarthritis of left knee         Plan:  At this point the patient was not a good surgical candidate for a total knee arthroplasty.    She was going to undergo and a cruise in a couple of weeks and she would like to have an injection in the week prior to her trip.    She does have Mobic at home when she was going to start using this to help decrease her pain reduce inflammation pain   She will follow up in proximally 2-3 weeks for an injection prior to her trip          DISCLAIMER: This note may have been dictated using voice recognition software and may contain grammatical errors.     NOTE: Consult report sent to referring provider via [employer] EMR.

## 2024-09-26 ENCOUNTER — TELEPHONE (OUTPATIENT)
Dept: ORTHOPEDICS | Facility: CLINIC | Age: 79
End: 2024-09-26

## 2024-09-26 ENCOUNTER — HOSPITAL ENCOUNTER (OUTPATIENT)
Dept: RADIOLOGY | Facility: CLINIC | Age: 79
Discharge: HOME | End: 2024-09-26
Payer: MEDICARE

## 2024-09-26 ENCOUNTER — OFFICE VISIT (OUTPATIENT)
Dept: ORTHOPEDICS | Facility: CLINIC | Age: 79
End: 2024-09-26
Payer: MEDICARE

## 2024-09-26 VITALS
WEIGHT: 229.81 LBS | SYSTOLIC BLOOD PRESSURE: 114 MMHG | HEART RATE: 73 BPM | BODY MASS INDEX: 39.23 KG/M2 | HEIGHT: 64 IN | DIASTOLIC BLOOD PRESSURE: 68 MMHG

## 2024-09-26 DIAGNOSIS — G56.03 BILATERAL CARPAL TUNNEL SYNDROME: ICD-10-CM

## 2024-09-26 DIAGNOSIS — M65.341 TRIGGER FINGER, RIGHT RING FINGER: ICD-10-CM

## 2024-09-26 DIAGNOSIS — M25.532 BILATERAL WRIST PAIN: Primary | ICD-10-CM

## 2024-09-26 DIAGNOSIS — M25.531 BILATERAL WRIST PAIN: ICD-10-CM

## 2024-09-26 DIAGNOSIS — M25.532 BILATERAL WRIST PAIN: ICD-10-CM

## 2024-09-26 DIAGNOSIS — M18.0 OSTEOARTHRITIS OF CARPOMETACARPAL (CMC) JOINT OF BOTH THUMBS: ICD-10-CM

## 2024-09-26 DIAGNOSIS — M25.531 BILATERAL WRIST PAIN: Primary | ICD-10-CM

## 2024-09-26 RX ORDER — BETAMETHASONE SODIUM PHOSPHATE AND BETAMETHASONE ACETATE 3; 3 MG/ML; MG/ML
6 INJECTION, SUSPENSION INTRA-ARTICULAR; INTRALESIONAL; INTRAMUSCULAR; SOFT TISSUE
Status: DISCONTINUED | OUTPATIENT
Start: 2024-09-26 | End: 2024-09-27 | Stop reason: HOSPADM

## 2024-09-26 RX ORDER — LIDOCAINE HYDROCHLORIDE 20 MG/ML
1 INJECTION, SOLUTION INFILTRATION; PERINEURAL
Status: DISCONTINUED | OUTPATIENT
Start: 2024-09-26 | End: 2024-09-27 | Stop reason: HOSPADM

## 2024-09-26 RX ADMIN — BETAMETHASONE SODIUM PHOSPHATE AND BETAMETHASONE ACETATE 6 MG: 3; 3 INJECTION, SUSPENSION INTRA-ARTICULAR; INTRALESIONAL; INTRAMUSCULAR; SOFT TISSUE at 02:09

## 2024-09-26 RX ADMIN — LIDOCAINE HYDROCHLORIDE 1 ML: 20 INJECTION, SOLUTION INFILTRATION; PERINEURAL at 02:09

## 2024-09-26 NOTE — PROCEDURES
Small Joint Aspiration/Injection: R thumb CMC    Date/Time: 9/26/2024 2:45 PM    Performed by: Gloria Woo PA-C  Authorized by: Gloria Woo PA-C    Consent Done?:  Yes (Verbal)  Indications:  Arthritis and pain  Site marked: the procedure site was marked    Timeout: prior to procedure the correct patient, procedure, and site was verified    Prep: patient was prepped and draped in usual sterile fashion      Location:  Thumb  Site:  R thumb CMC  Medications:  1 mL LIDOcaine HCL 20 mg/ml (2%) 20 mg/mL (2 %); 6 mg betamethasone acetate-betamethasone sodium phosphate 6 mg/mL  Patient tolerance:  Patient tolerated the procedure well with no immediate complications

## 2024-09-26 NOTE — PROCEDURES
Small Joint Aspiration/Injection: L thumb CMC    Date/Time: 9/26/2024 2:45 PM    Performed by: Gloria Woo PA-C  Authorized by: Gloria Woo PA-C    Consent Done?:  Yes (Verbal)  Indications:  Arthritis and pain  Site marked: the procedure site was marked    Timeout: prior to procedure the correct patient, procedure, and site was verified    Prep: patient was prepped and draped in usual sterile fashion      Location:  Thumb  Site:  L thumb CMC  Medications:  1 mL LIDOcaine HCL 20 mg/ml (2%) 20 mg/mL (2 %); 6 mg betamethasone acetate-betamethasone sodium phosphate 6 mg/mL  Patient tolerance:  Patient tolerated the procedure well with no immediate complications

## 2024-09-26 NOTE — PROGRESS NOTES
Subjective:    CC: Wrist Pain (Hugo wrist pain - Ongoing pain for years. Has had injections in hands 1/31/24 which has helped. Would like to get injections today. Is wearing braces on hugo wrist which do not help with pain due to type of brace she has. Reports numbness/ burning sensation through hands.)       HPI:  Patient presents to clinic for repeat evaluation of bilateral wrist pain.  She states he has been ongoing for years.  She has had previous bilateral carpal tunnel release with Dr. Colby 20 years ago.  She most recently saw Dr. Millan in January where she received bilateral CMC steroid injections along with a left carpal tunnel steroid injection.  She states the injections to the CMC do help however she feels the carpal tunnel injection did not.  She was bilateral wrist braces on today.  Utilizing Mobic twice a week.  She is considering surgical intervention at the beginning of next year.  She would like to repeat steroid injections today.      ROS: Refer to HPI for pertinent ROS. All other 12 point systems negative.    Past Medical History:   Diagnosis Date    Anxiety     Hyperlipidemia     Hypertension     Hypothyroidism     Neuropathy         Past Surgical History:   Procedure Laterality Date    ABLATION      ADENOIDECTOMY      APPENDECTOMY      ARTHROSCOPY OF BOTH KNEES      CATARACT EXTRACTION      CHOLECYSTECTOMY      COLECTOMY      CYST REMOVAL      EYE SURGERY      HAND SURGERY      hernia repair      HERNIA REPAIR      Hernia surgeries x3    HYSTERECTOMY      JOINT REPLACEMENT  2012    LIPOMA RESECTION      MOLE REMOVAL      SHOULDER ARTHROSCOPY W/ ROTATOR CUFF REPAIR      THYROIDECTOMY, PARTIAL      TONSILLECTOMY  1953    TOTAL KNEE ARTHROPLASTY          Current Outpatient Medications on File Prior to Visit   Medication Sig Dispense Refill    alendronate (FOSAMAX) 70 MG tablet Take 70 mg by mouth.      ALPRAZOLAM ORAL Take 5 mg by mouth.      aspirin 81 mg Cap Take 81 mg by mouth.       atorvastatin (LIPITOR) 20 MG tablet SMARTSI Tablet(s) By Mouth Every Evening      diclofenac sodium (VOLTAREN) 1 % Gel Apply 2 g topically once daily.      fluticasone propionate (FLONASE) 50 mcg/actuation nasal spray SHAKE LIQUID AND USE 1 SPRAY IN EACH NOSTRIL DAILY AS NEEDED FOR ALLERGY SYMPTOMS      furosemide (LASIX) 20 MG tablet Take 20 mg by mouth once daily.      levothyroxine (SYNTHROID) 150 MCG tablet Take 150 mcg by mouth once daily.      lisinopriL (PRINIVIL,ZESTRIL) 20 MG tablet Take 20 mg by mouth once daily.      multivitamin (ONE DAILY MULTIVITAMIN) per tablet Take 1 tablet by mouth once daily.      OZEMPIC 0.25 mg or 0.5 mg (2 mg/3 mL) pen injector INJECT 0.5 MG SUBCUTANEOUS ONCE WEEKLY      traMADoL (ULTRAM) 50 mg tablet Take 50 mg by mouth.      triamcinolone acetonide 0.1% (KENALOG) 0.1 % cream Apply 1 Application topically 3 (three) times daily as needed.      trospium (SANCTURA) 20 mg Tab tablet Take 20 mg by mouth 2 (two) times daily.      cetirizine (ZYRTEC) 10 MG tablet Take 10 mg by mouth.      gabapentin (NEURONTIN) 300 MG capsule Take 300 mg by mouth.      montelukast (SINGULAIR) 10 mg tablet Take 10 mg by mouth. (Patient not taking: Reported on 2024)      pregabalin (LYRICA) 75 MG capsule Take 75 mg by mouth 3 (three) times daily. (Patient not taking: Reported on 2024)      valACYclovir (VALTREX) 1000 MG tablet Take 1 tablet (1,000 mg total) by mouth 2 (two) times daily. for 7 days 14 tablet 0     No current facility-administered medications on file prior to visit.        Review of patient's allergies indicates:   Allergen Reactions    Sulfamethoxazole-trimethoprim Itching       Objective:    Vitals:    24 1451   BP: 114/68   Pulse: 73        Physical Exam:  Right upper extremity compartments are soft and warm.  There are no signs or symptoms of DVT or infection.  Skin is intact.  Her previous incision is well healed.  The patient is tender to palpation about the base  of the thumb aspect as well as the A1 pulley of the right ring finger.  There is triggering with flexion and extension of the ring finger.  She had decreased range of motion of the thumb.  Numbness and tingling of all 5 digits.  Supination and pronation to 90 degrees.  Wrist flexion to 90 degrees and wrist extension to 70 degrees.  Positive Tinel's and Phalen's test.  Negative Tinel's at the cubital tunnel.  There is some thenar atrophy.  No hypothenar atrophy.  Negative Finkelstein´s test.  Positive CMC grind. Neurovascularly intact distally.    Left upper extremity compartments are soft and warm.  There are no signs or symptoms of DVT or infection.  Skin is intact.  Previous incision well healed.  The patient is tender to palpation about the base of the thumb aspect.  Decreased range of motion of the thumb.  Supination and pronation to 90 degrees.  Wrist flexion to 90 degrees and wrist extension to 70 degrees.  Positive Tinel's and Phalen's test at the carpal tunnel.  Negative Tinel's at the cubital tunnel.  Negative Finkelstein´s test.  Positive CMC grind. Neurovascularly intact distally.    Images:  Previous Images Reviewed and discussed with patient.    Assessment:  1. Bilateral wrist pain  - X-Ray Wrist Complete Right; Future  - X-Ray Wrist Complete Left; Future    2. Osteoarthritis of carpometacarpal (CMC) joint of both thumbs  - Small Joint Aspiration/Injection: L thumb CMC  - LIDOcaine HCL 20 mg/ml (2%) injection 1 mL  - betamethasone acetate-betamethasone sodium phosphate injection 6 mg  - Small Joint Aspiration/Injection: R thumb CMC  - LIDOcaine HCL 20 mg/ml (2%) injection 1 mL  - betamethasone acetate-betamethasone sodium phosphate injection 6 mg  - Ambulatory referral/consult to Neurology; Future    3. Bilateral carpal tunnel syndrome  - Ambulatory referral/consult to Neurology; Future    4. Trigger finger, right ring finger       Plan:  Physical exam and previous imaging findings discussed with  patient.  She tolerated bilateral thumb CMC steroid injections well today in clinic.  She was placed into bilateral thumb spica splints.  Given instructions to come out periodically to work on hand range of motion and exercises as to not get stiff and weak.  She will continue Mobic as needed.  We have discussed surgical intervention in the future.  We will proceed with a bilateral upper extremity nerve conduction study.  Patient has carpal tunnel splints for at night when needed.  We have also discussed possible steroid injection in the future to trigger finger if needed.  I would like to see the patient back in 3 weeks for review her nerve conduction study to assess her progress.      Follow up: Follow up in about 3 weeks (around 10/17/2024).    Small Joint Aspiration/Injection: L thumb CMC    Date/Time: 9/26/2024 2:45 PM    Performed by: Gloria Woo PA-C  Authorized by: Gloria Woo PA-C    Consent Done?:  Yes (Verbal)  Indications:  Arthritis and pain  Site marked: the procedure site was marked    Timeout: prior to procedure the correct patient, procedure, and site was verified    Prep: patient was prepped and draped in usual sterile fashion      Location:  Thumb  Site:  L thumb CMC  Medications:  1 mL LIDOcaine HCL 20 mg/ml (2%) 20 mg/mL (2 %); 6 mg betamethasone acetate-betamethasone sodium phosphate 6 mg/mL  Patient tolerance:  Patient tolerated the procedure well with no immediate complications      Small Joint Aspiration/Injection: R thumb CMC    Date/Time: 9/26/2024 2:45 PM    Performed by: Gloria Woo PA-C  Authorized by: Gloria Woo PA-C    Consent Done?:  Yes (Verbal)  Indications:  Arthritis and pain  Site marked: the procedure site was marked    Timeout: prior to procedure the correct patient, procedure, and site was verified    Prep: patient was prepped and draped in usual sterile fashion      Location:  Thumb  Site:  R thumb CMC  Medications:  1 mL LIDOcaine HCL 20  mg/ml (2%) 20 mg/mL (2 %); 6 mg betamethasone acetate-betamethasone sodium phosphate 6 mg/mL  Patient tolerance:  Patient tolerated the procedure well with no immediate complications

## 2024-10-17 ENCOUNTER — OFFICE VISIT (OUTPATIENT)
Dept: ORTHOPEDICS | Facility: CLINIC | Age: 79
End: 2024-10-17
Payer: MEDICARE

## 2024-10-17 VITALS — BODY MASS INDEX: 39.22 KG/M2 | HEIGHT: 64 IN | WEIGHT: 229.75 LBS

## 2024-10-17 DIAGNOSIS — M18.0 OSTEOARTHRITIS OF CARPOMETACARPAL (CMC) JOINT OF BOTH THUMBS: Primary | ICD-10-CM

## 2024-10-17 DIAGNOSIS — M65.341 TRIGGER FINGER, RIGHT RING FINGER: ICD-10-CM

## 2024-10-17 DIAGNOSIS — G56.03 BILATERAL CARPAL TUNNEL SYNDROME: ICD-10-CM

## 2024-10-17 PROCEDURE — 99213 OFFICE O/P EST LOW 20 MIN: CPT | Mod: ,,,

## 2024-10-17 NOTE — PROGRESS NOTES
Subjective:    CC: Results ([facility] NCS results)       HPI:  Patient presents to clinic for review of bilateral upper extremity nerve conduction study results.  She does have moderate right and mild left carpal tunnel syndrome.  She has had previous carpal tunnel releases with Dr. Colby 20 years ago.  She has tried a left carpal tunnel steroid injection back in January Dr. Millan that did not give her any relief.  She continues to have pain about all 5 digits of both hands.  Right worse than left.  We will occasionally drop things.  Patient does have a history of bilateral CMC osteoarthritis.  She most recently received bilateral CMC steroid injections on 09/26/2024.  She states these did not give her any relief.  She is wearing bilateral thumb spica splints.  Taking Mobic twice a week.  She states she was told not to take more often than this given her kidney disease.  She continues to have triggering of the right ring finger.  She states this is the majority of her pain.      ROS: Refer to HPI for pertinent ROS. All other 12 point systems negative.    Past Medical History:   Diagnosis Date    Anxiety     Hyperlipidemia     Hypertension     Hypothyroidism     Neuropathy         Past Surgical History:   Procedure Laterality Date    ABLATION      ADENOIDECTOMY      APPENDECTOMY      ARTHROSCOPY OF BOTH KNEES      CATARACT EXTRACTION      CHOLECYSTECTOMY      COLECTOMY      CYST REMOVAL      EYE SURGERY      HAND SURGERY      hernia repair      HERNIA REPAIR      Hernia surgeries x3    HYSTERECTOMY      JOINT REPLACEMENT  2012    LIPOMA RESECTION      MOLE REMOVAL      SHOULDER ARTHROSCOPY W/ ROTATOR CUFF REPAIR      THYROIDECTOMY, PARTIAL      TONSILLECTOMY  1953    TOTAL KNEE ARTHROPLASTY          Current Outpatient Medications on File Prior to Visit   Medication Sig Dispense Refill    alendronate (FOSAMAX) 70 MG tablet Take 70 mg by mouth.      ALPRAZOLAM ORAL Take 5 mg by mouth.      aspirin 81 mg Cap Take 81 mg by  mouth.      atorvastatin (LIPITOR) 20 MG tablet SMARTSI Tablet(s) By Mouth Every Evening      diclofenac sodium (VOLTAREN) 1 % Gel Apply 2 g topically once daily.      fluticasone propionate (FLONASE) 50 mcg/actuation nasal spray SHAKE LIQUID AND USE 1 SPRAY IN EACH NOSTRIL DAILY AS NEEDED FOR ALLERGY SYMPTOMS      furosemide (LASIX) 20 MG tablet Take 20 mg by mouth once daily.      levothyroxine (SYNTHROID) 150 MCG tablet Take 150 mcg by mouth once daily.      lisinopriL (PRINIVIL,ZESTRIL) 20 MG tablet Take 20 mg by mouth once daily.      multivitamin (ONE DAILY MULTIVITAMIN) per tablet Take 1 tablet by mouth once daily.      OZEMPIC 0.25 mg or 0.5 mg (2 mg/3 mL) pen injector INJECT 0.5 MG SUBCUTANEOUS ONCE WEEKLY      traMADoL (ULTRAM) 50 mg tablet Take 50 mg by mouth.      triamcinolone acetonide 0.1% (KENALOG) 0.1 % cream Apply 1 Application topically 3 (three) times daily as needed.      trospium (SANCTURA) 20 mg Tab tablet Take 20 mg by mouth 2 (two) times daily.      cetirizine (ZYRTEC) 10 MG tablet Take 10 mg by mouth.      gabapentin (NEURONTIN) 300 MG capsule Take 300 mg by mouth.      montelukast (SINGULAIR) 10 mg tablet Take 10 mg by mouth. (Patient not taking: Reported on 2024)      pregabalin (LYRICA) 75 MG capsule Take 75 mg by mouth 3 (three) times daily. (Patient not taking: Reported on 10/17/2024)      valACYclovir (VALTREX) 1000 MG tablet Take 1 tablet (1,000 mg total) by mouth 2 (two) times daily. for 7 days 14 tablet 0     No current facility-administered medications on file prior to visit.        Review of patient's allergies indicates:   Allergen Reactions    Sulfamethoxazole-trimethoprim Itching       Objective:    There were no vitals filed for this visit.     Physical Exam: Right upper extremity compartments are soft and warm.  There are no signs or symptoms of DVT or infection.  Skin is intact.  Her previous incision is well healed.  The patient is tender to palpation about the  base of the thumb aspect as well as the A1 pulley of the right ring finger.  There is triggering with flexion and extension of the ring finger.  She had decreased range of motion of the thumb.  Numbness and tingling of all 5 digits.  Supination and pronation to 90 degrees.  Wrist flexion to 90 degrees and wrist extension to 70 degrees.  Positive Tinel's and Phalen's test.  Negative Tinel's at the cubital tunnel.  There is some thenar atrophy.  No hypothenar atrophy.  Negative Finkelstein´s test.  Positive CMC grind. Neurovascularly intact distally.     Left upper extremity compartments are soft and warm.  There are no signs or symptoms of DVT or infection.  Skin is intact.  Previous incision well healed.  The patient is tender to palpation about the base of the thumb aspect.  Decreased range of motion of the thumb.  Supination and pronation to 90 degrees.  Wrist flexion to 90 degrees and wrist extension to 70 degrees.  Positive Tinel's and Phalen's test at the carpal tunnel.  Negative Tinel's at the cubital tunnel.  Negative Finkelstein´s test.  Positive CMC grind. Neurovascularly intact distally.     Images:  Previous Images Reviewed and discussed with patient.    Assessment:  1. Osteoarthritis of carpometacarpal (CMC) joint of both thumbs    2. Bilateral carpal tunnel syndrome    3. Trigger finger, right ring finger       Plan:  Physical exam and previous imaging findings as well as nerve conduction study findings again discussed with patient.  Patient has multiple hand issues going on today.  We have discussed multiple treatment options.  She is interested in surgical intervention but states she would like to get through her cruise in February 1st.  She was offered a right ring trigger finger steroid injection today but states it is not bothering her enough for this at this time.  She will return back to her thumb spica splint and Mobic as needed with the appropriate precautions.  Patient states she will call she  feels she would benefit from steroid injection versus scheduling surgery.  Will likely proceed with right thumb CMC arthroplasty, carpal tunnel release and right ring trigger finger in March.    Follow up: Follow up if symptoms worsen or fail to improve.

## 2024-12-09 ENCOUNTER — LAB VISIT (OUTPATIENT)
Dept: LAB | Facility: HOSPITAL | Age: 79
End: 2024-12-09
Attending: INTERNAL MEDICINE
Payer: MEDICARE

## 2024-12-09 DIAGNOSIS — E78.1 PURE HYPERGLYCERIDEMIA: ICD-10-CM

## 2024-12-09 DIAGNOSIS — E03.8 TOXIC DIFFUSE GOITER WITH PRETIBIAL MYXEDEMA: ICD-10-CM

## 2024-12-09 DIAGNOSIS — R79.9 ABNORMAL FINDING OF BLOOD CHEMISTRY, UNSPECIFIED: ICD-10-CM

## 2024-12-09 DIAGNOSIS — E05.00 TOXIC DIFFUSE GOITER WITH PRETIBIAL MYXEDEMA: ICD-10-CM

## 2024-12-09 DIAGNOSIS — Z11.59 SCREENING EXAMINATION FOR POLIOMYELITIS: Primary | ICD-10-CM

## 2024-12-09 LAB
ALBUMIN SERPL-MCNC: 3.5 G/DL (ref 3.4–4.8)
ALBUMIN/GLOB SERPL: 1.1 RATIO (ref 1.1–2)
ALP SERPL-CCNC: 72 UNIT/L (ref 40–150)
ALT SERPL-CCNC: 24 UNIT/L (ref 0–55)
ANION GAP SERPL CALC-SCNC: 8 MEQ/L
AST SERPL-CCNC: 18 UNIT/L (ref 5–34)
BILIRUB SERPL-MCNC: 0.5 MG/DL
BUN SERPL-MCNC: 10.1 MG/DL (ref 9.8–20.1)
CALCIUM SERPL-MCNC: 8.6 MG/DL (ref 8.4–10.2)
CHLORIDE SERPL-SCNC: 108 MMOL/L (ref 98–107)
CHOLEST SERPL-MCNC: 160 MG/DL
CHOLEST/HDLC SERPL: 4 {RATIO} (ref 0–5)
CO2 SERPL-SCNC: 25 MMOL/L (ref 23–31)
CREAT SERPL-MCNC: 0.81 MG/DL (ref 0.55–1.02)
CREAT/UREA NIT SERPL: 12
EST. AVERAGE GLUCOSE BLD GHB EST-MCNC: 108.3 MG/DL
GFR SERPLBLD CREATININE-BSD FMLA CKD-EPI: >60 ML/MIN/1.73/M2
GLOBULIN SER-MCNC: 3.1 GM/DL (ref 2.4–3.5)
GLUCOSE SERPL-MCNC: 96 MG/DL (ref 82–115)
HBA1C MFR BLD: 5.4 %
HDLC SERPL-MCNC: 44 MG/DL (ref 35–60)
LDLC SERPL CALC-MCNC: 89 MG/DL (ref 50–140)
POTASSIUM SERPL-SCNC: 4.3 MMOL/L (ref 3.5–5.1)
PROT SERPL-MCNC: 6.6 GM/DL (ref 5.8–7.6)
SODIUM SERPL-SCNC: 141 MMOL/L (ref 136–145)
TRIGL SERPL-MCNC: 136 MG/DL (ref 37–140)
VLDLC SERPL CALC-MCNC: 27 MG/DL

## 2024-12-09 PROCEDURE — 36415 COLL VENOUS BLD VENIPUNCTURE: CPT

## 2024-12-09 PROCEDURE — 83036 HEMOGLOBIN GLYCOSYLATED A1C: CPT

## 2024-12-09 PROCEDURE — 80053 COMPREHEN METABOLIC PANEL: CPT

## 2024-12-09 PROCEDURE — 80061 LIPID PANEL: CPT

## 2025-04-17 ENCOUNTER — LAB VISIT (OUTPATIENT)
Dept: LAB | Facility: HOSPITAL | Age: 80
End: 2025-04-17
Attending: INTERNAL MEDICINE
Payer: MEDICARE

## 2025-04-17 DIAGNOSIS — I49.3 VENTRICULAR PREMATURE BEATS: Primary | ICD-10-CM

## 2025-04-17 DIAGNOSIS — I10 ESSENTIAL HYPERTENSION, MALIGNANT: ICD-10-CM

## 2025-04-17 LAB
ANION GAP SERPL CALC-SCNC: 8 MEQ/L
BUN SERPL-MCNC: 11.5 MG/DL (ref 9.8–20.1)
CALCIUM SERPL-MCNC: 8.9 MG/DL (ref 8.4–10.2)
CHLORIDE SERPL-SCNC: 108 MMOL/L (ref 98–107)
CO2 SERPL-SCNC: 27 MMOL/L (ref 23–31)
CREAT SERPL-MCNC: 1.16 MG/DL (ref 0.55–1.02)
CREAT/UREA NIT SERPL: 10
GFR SERPLBLD CREATININE-BSD FMLA CKD-EPI: 47 ML/MIN/1.73/M2
GLUCOSE SERPL-MCNC: 110 MG/DL (ref 82–115)
POTASSIUM SERPL-SCNC: 4.1 MMOL/L (ref 3.5–5.1)
SODIUM SERPL-SCNC: 143 MMOL/L (ref 136–145)

## 2025-04-17 PROCEDURE — 36415 COLL VENOUS BLD VENIPUNCTURE: CPT

## 2025-04-17 PROCEDURE — 80048 BASIC METABOLIC PNL TOTAL CA: CPT

## 2025-04-29 ENCOUNTER — HOSPITAL ENCOUNTER (OUTPATIENT)
Dept: RADIOLOGY | Facility: HOSPITAL | Age: 80
Discharge: HOME | End: 2025-04-29
Attending: FAMILY MEDICINE
Payer: MEDICARE

## 2025-04-29 DIAGNOSIS — Z12.31 ENCOUNTER FOR SCREENING MAMMOGRAM FOR BREAST CANCER: ICD-10-CM

## 2025-04-29 DIAGNOSIS — Z78.0 POST-MENOPAUSE: ICD-10-CM

## 2025-04-29 PROCEDURE — 77063 BREAST TOMOSYNTHESIS BI: CPT | Mod: 26,,, | Performed by: STUDENT IN AN ORGANIZED HEALTH CARE EDUCATION/TRAINING PROGRAM

## 2025-04-29 PROCEDURE — 77063 BREAST TOMOSYNTHESIS BI: CPT | Mod: TC

## 2025-04-29 PROCEDURE — 77080 DXA BONE DENSITY AXIAL: CPT | Mod: TC

## 2025-04-29 PROCEDURE — 77067 SCR MAMMO BI INCL CAD: CPT | Mod: 26,,, | Performed by: STUDENT IN AN ORGANIZED HEALTH CARE EDUCATION/TRAINING PROGRAM
